# Patient Record
Sex: MALE | Race: WHITE | Employment: OTHER | URBAN - METROPOLITAN AREA
[De-identification: names, ages, dates, MRNs, and addresses within clinical notes are randomized per-mention and may not be internally consistent; named-entity substitution may affect disease eponyms.]

---

## 2017-03-13 ENCOUNTER — APPOINTMENT (OUTPATIENT)
Dept: GENERAL RADIOLOGY | Age: 22
End: 2017-03-13
Attending: EMERGENCY MEDICINE
Payer: OTHER GOVERNMENT

## 2017-03-13 ENCOUNTER — HOSPITAL ENCOUNTER (EMERGENCY)
Age: 22
Discharge: HOME | End: 2017-03-13
Attending: EMERGENCY MEDICINE | Admitting: EMERGENCY MEDICINE
Payer: OTHER GOVERNMENT

## 2017-03-13 VITALS
TEMPERATURE: 97.4 F | BODY MASS INDEX: 22.9 KG/M2 | DIASTOLIC BLOOD PRESSURE: 85 MMHG | OXYGEN SATURATION: 100 % | SYSTOLIC BLOOD PRESSURE: 100 MMHG | HEIGHT: 70 IN | WEIGHT: 160 LBS | HEART RATE: 63 BPM

## 2017-03-13 DIAGNOSIS — S93.412A SPRAIN OF CALCANEOFIBULAR LIGAMENT OF LEFT ANKLE, INITIAL ENCOUNTER: Primary | ICD-10-CM

## 2017-03-13 PROCEDURE — 74011250637 HC RX REV CODE- 250/637: Performed by: PHYSICIAN ASSISTANT

## 2017-03-13 PROCEDURE — 73610 X-RAY EXAM OF ANKLE: CPT

## 2017-03-13 PROCEDURE — 99284 EMERGENCY DEPT VISIT MOD MDM: CPT

## 2017-03-13 RX ORDER — NAPROXEN 375 MG/1
375 TABLET ORAL 2 TIMES DAILY WITH MEALS
Qty: 20 TAB | Refills: 0 | Status: SHIPPED | OUTPATIENT
Start: 2017-03-13

## 2017-03-13 RX ORDER — TRAMADOL HYDROCHLORIDE 50 MG/1
50 TABLET ORAL
Qty: 20 TAB | Refills: 0 | Status: SHIPPED | OUTPATIENT
Start: 2017-03-13 | End: 2017-03-23

## 2017-03-13 RX ORDER — IBUPROFEN 600 MG/1
600 TABLET ORAL
Status: COMPLETED | OUTPATIENT
Start: 2017-03-13 | End: 2017-03-13

## 2017-03-13 RX ADMIN — IBUPROFEN 600 MG: 600 TABLET, FILM COATED ORAL at 08:40

## 2017-03-13 NOTE — DISCHARGE INSTRUCTIONS
Ankle Sprain: Care Instructions  Your Care Instructions    An ankle sprain can happen when you twist your ankle. The ligaments that support the ankle can get stretched and torn. Often the ankle is swollen and painful. Ankle sprains may take from several weeks to several months to heal. Usually, the more pain and swelling you have, the more severe your ankle sprain is and the longer it will take to heal. You can heal faster and regain strength in your ankle with good home treatment. It is very important to give your ankle time to heal completely, so that you do not easily hurt your ankle again. Follow-up care is a key part of your treatment and safety. Be sure to make and go to all appointments, and call your doctor if you are having problems. It's also a good idea to know your test results and keep a list of the medicines you take. How can you care for yourself at home? · Prop up your foot on pillows as much as possible for the next 3 days. Try to keep your ankle above the level of your heart. This will help reduce the swelling. · Follow your doctor's directions for wearing a splint or elastic bandage. Wrapping the ankle may help reduce or prevent swelling. · Your doctor may give you a splint, a brace, an air stirrup, or another form of ankle support to protect your ankle until it is healed. Wear it as directed while your ankle is healing. Do not remove it unless your doctor tells you to. After your ankle has healed, ask your doctor whether you should wear the brace when you exercise. · Put ice or cold packs on your injured ankle for 10 to 20 minutes at a time. Try to do this every 1 to 2 hours for the next 3 days (when you are awake) or until the swelling goes down. Put a thin cloth between the ice and your skin. · You may need to use crutches until you can walk without pain. If you do use crutches, try to bear some weight on your injured ankle if you can do so without pain.  This helps the ankle heal.  · Take pain medicines exactly as directed. ¨ If the doctor gave you a prescription medicine for pain, take it as prescribed. ¨ If you are not taking a prescription pain medicine, ask your doctor if you can take an over-the-counter medicine. · If you have been given ankle exercises to do at home, do them exactly as instructed. These can promote healing and help prevent lasting weakness. When should you call for help? Call your doctor now or seek immediate medical care if:  · Your pain is getting worse. · Your swelling is getting worse. · Your splint feels too tight or you are unable to loosen it. Watch closely for changes in your health, and be sure to contact your doctor if:  · You are not getting better after 1 week. Where can you learn more? Go to http://baljinder-jenni.info/. Enter Q968 in the search box to learn more about \"Ankle Sprain: Care Instructions. \"  Current as of: May 23, 2016  Content Version: 11.1  © 4451-8394 [employer]. Care instructions adapted under license by [employer] (which disclaims liability or warranty for this information). If you have questions about a medical condition or this instruction, always ask your healthcare professional. [address] any warranty or liability for your use of this information. Muscle Strain: Care Instructions  Your Care Instructions  A muscle strain happens when you overstretch, or pull, a muscle. It can happen when you exercise or lift something or when you have an accident. Rest and other home care can help the muscle heal.  Follow-up care is a key part of your treatment and safety. Be sure to make and go to all appointments, and call your doctor if you are having problems. Its also a good idea to know your test results and keep a list of the medicines you take. How can you care for yourself at home? · Rest the strained muscle. Do not put weight on it for a day or two. If your doctor advises you to, use crutches or a sling to rest a sore limb. · Put ice or a cold pack on the sore muscle for 10 to 20 minutes at a time to stop swelling. Put a thin cloth between the ice pack and your skin. · Prop up the sore arm or leg on a pillow when you ice it or anytime you sit or lie down during the next 3 days. Try to keep it above the level of your heart. This will help reduce swelling. · Take pain medicines exactly as directed. ¨ If the doctor gave you a prescription medicine for pain, take it as prescribed. ¨ If you are not taking a prescription pain medicine, ask your doctor if you can take an over-the-counter medicine. · Do not do anything that makes the pain worse. Return to exercise gradually as you feel better. When should you call for help? Call your doctor now or seek immediate medical care if:  · You have new severe pain. · Your injured limb is cool or pale or changes color. · You have tingling, weakness, or numbness in your injured limb. · You cannot move the injured area. Watch closely for changes in your health, and be sure to contact your doctor if:  · You cannot put weight on a joint, or it feels unsteady when you walk. · Pain and swelling get worse or do not start to get better after 2 days of home treatment. Where can you learn more? Go to http://baljinder-jenni.info/. Enter X562 in the search box to learn more about \"Muscle Strain: Care Instructions. \"  Current as of: May 23, 2016  Content Version: 11.1  © 1070-0015 [employer]. Care instructions adapted under license by [employer] (which disclaims liability or warranty for this information). If you have questions about a medical condition or this instruction, always ask your healthcare professional. [address] any warranty or liability for your use of this information.

## 2017-03-13 NOTE — ED PROVIDER NOTES
HPI Comments: 8:51 AM  Sheryle Clara is a 24 y.o. male presenting to the ED c/o lateral left ankle pain onset pta. [facility] and was participating in PT this AM. Pt was running when he rolled his left ankle resulting in pain. Denies numbness, tingling, weakness, and any other injuries sxs or complaints. Patient is a 24 y.o. male presenting with ankle problem. The history is provided by the patient. Ankle Injury    This is a new problem. The current episode started less than 1 hour ago. The pain is present in the left ankle. The pain is at a severity of 5/10. Associated symptoms include limited range of motion (due to pain). Pertinent negatives include no numbness, no tingling, no back pain and no neck pain. The symptoms are aggravated by standing, movement and palpation. History reviewed. No pertinent past medical history. History reviewed. No pertinent surgical history. History reviewed. No pertinent family history. Social History     Social History    Marital status: SINGLE     Spouse name: N/A    Number of children: N/A    Years of education: N/A     Occupational History    Not on file. Social History Main Topics    Smoking status: Never Smoker    Smokeless tobacco: Not on file    Alcohol use No    Drug use: Not on file    Sexual activity: Not on file     Other Topics Concern    Not on file     Social History Narrative    No narrative on file         ALLERGIES: Review of patient's allergies indicates no known allergies. Review of Systems   Musculoskeletal: Positive for arthralgias and joint swelling. Negative for back pain and neck pain. Neurological: Negative for tingling, syncope, weakness and numbness. All other systems reviewed and are negative.       Vitals:    03/13/17 0831   BP: 100/85   Pulse: 63   Temp: 97.4 °F (36.3 °C)   SpO2: 100%   Weight: 72.6 kg (160 lb)   Height: 5' 10\" (1.778 m)            Physical Exam   Nursing note and vitals reviewed. Vital signs and nursing notes reviewed  GEN:  Nontoxic appearing; Alert and Oriented; Appears well hydrated and with normal Cap Refill  SKIN:  Warm and dry, no rashes. No petechia. Good skin turgor. NECK:  Supple. Trachea is Midline; No adenopathy. EXT:  No obvious soft tissue tenderness or sites of bony tenderness; Joints- good ROM  NEURO: CN- intact; No focal motor deficits; Cerebellar function- intact; DTR's - 2+ equal    FOCUSED EXAM: Left ankle tender with swelling to lateral malleolus. No obvious deformities, no laxity. No tenderness to the proximal portions of lower leg. MDM  Number of Diagnoses or Management Options  Sprain of calcaneofibular ligament of left ankle, initial encounter:   Diagnosis management comments: INITIAL CLINICAL IMPRESSION and PLANS:  The patient presents with the primary complaint(s) of: sustained isolated injury to the left ankle. The presentation, to include historical aspects and clinical findings appear to be consistent with the DX of ankle sprain. However, other possible DX's to consider as primary, associated with, or exacerbated by include:    1.  fx    Considering the above, my initial management plan to evaluate and therapeutic interventions include: Obtain Radiologic studies,  As well as those noted in the orders:    Laly Villaseñor MD        Amount and/or Complexity of Data Reviewed  Tests in the radiology section of CPT®: ordered and reviewed (XR Lt ankle)      ED Course       Procedures    FINAL CLINICAL IMPRESSION AND DISPOSITION DECISION  - DISCHARGE:  9:49 AM  I reviewed our electronic medical record system for any past medical records that were available that may contribute to the patients current condition, the nursing notes and vital signs from today's visit. Based on the clinical presentation and findings the clinical impression noted below is straight forward.        Studies indicated and / or done during this ED encounter:   NONE INDICATED AT THIS TIME    RESULTS:   XR ANKLE LT MIN 3 V   Final Result   IMPRESSION:     No fracture. As read by the radiologist.        Labs Reviewed - No data to display   No results found for this or any previous visit (from the past 12 hour(s)). Intervention(s) while in ED: NONE INDICATED AT THIS TIME    MEDICATIONS GIVEN:   Medications   ibuprofen (MOTRIN) tablet 600 mg (600 mg Oral Given 3/13/17 0840)       Present condition:  STABLE    Planned Treatment Management Upon Discharge: SYMPTOMATIC TREATMENT    SPECIFIC CONVERSATIONS:     I feel that we have optimized outpatient assessment and management such that Alia Bryson is stable to be discharged and to continue with him care or complete any additional evaluation as appropriate at home or as an outpatient. DISCHARGE NOTE:   Bobby Poster  results have been reviewed with him. He  has been counseled regarding his  diagnosis. He  verbally conveys understanding and agreement of the signs, symptoms, diagnosis, treatment and prognosis and additionally agrees to follow up as recommended with Phys Other, MD  In 24 - 48 hours. He  also agrees with the care-plan and conveys that all of his questions have been answered. I have also put together some discharge instructions for him that include: 1) educational information regarding their diagnosis, 2) how to care for their diagnosis at home, as well a 3) list of reasons why they would want to return to the ED prior to their follow-up appointment, should their condition change. The patient and/or family has been provided with education for proper Emergency Department utilization. CLINICAL IMPRESSION  1. Sprain of calcaneofibular ligament of left ankle, initial encounter          PLAN:  1. D/C home  2. Discharge Medication List as of 3/13/2017  9:48 AM      START taking these medications    Details   naproxen (NAPROSYN) 375 mg tablet Take 1 Tab by mouth two (2) times daily (with meals). , Print, Disp-20 Tab, R-0      traMADol (ULTRAM) 50 mg tablet Take 1 Tab by mouth every six (6) hours as needed for Pain for up to 10 days. Max Daily Amount: 200 mg., Print, Disp-20 Tab, R-0           3. Follow-up Information     Follow up With Details Comments Contact Info    DURGA Schedule an appointment as soon as possible for a visit in 3 days for further evaluation [phone]    [facility] EMERGENCY DEPT Go to As needed, If symptoms worsen [address] [facility] [id number] [phone]        Return if sxs worsen    ATTESTATIONS:  This note is prepared by Yamilka Velazquez, acting as Scribe for Maya Willams MD.     Maya Willams MD: The scribe's documentation has been prepared under my direction and personally reviewed by me in its entirety. I confirm that the note above accurately reflects all work, treatment, procedures, and medical decision making performed by me.

## 2017-03-13 NOTE — LETTER
[facility] [place] 
[facility] 
[address] [address] 
[phone] Work Note Date: 3/13/2017 To Whom It May concern: 
 
Checo Njjerel was seen and treated today in the emergency room by the following provider(s): 
Attending Provider: Joana Cohen MD. Checo Serrano Special Instructions:  Please allow quarters today.   
 
Sincerely, 
 
 
 
 
Joana Cohen MD